# Patient Record
Sex: MALE | URBAN - METROPOLITAN AREA
[De-identification: names, ages, dates, MRNs, and addresses within clinical notes are randomized per-mention and may not be internally consistent; named-entity substitution may affect disease eponyms.]

---

## 2018-08-28 ENCOUNTER — INPATIENT (INPATIENT)
Facility: HOSPITAL | Age: 63
LOS: 2 days | Discharge: HOME | End: 2018-08-31
Attending: INTERNAL MEDICINE | Admitting: INTERNAL MEDICINE
Payer: COMMERCIAL

## 2018-08-28 VITALS
TEMPERATURE: 99 F | HEART RATE: 113 BPM | HEIGHT: 67 IN | DIASTOLIC BLOOD PRESSURE: 87 MMHG | RESPIRATION RATE: 20 BRPM | OXYGEN SATURATION: 97 % | WEIGHT: 279.99 LBS | SYSTOLIC BLOOD PRESSURE: 161 MMHG

## 2018-08-28 PROCEDURE — 99285 EMERGENCY DEPT VISIT HI MDM: CPT | Mod: 25

## 2018-08-28 NOTE — ED ADULT NURSE NOTE - OBJECTIVE STATEMENT
Per wife last seen normal Friday night , Saturday Morning noted intermediate slurring. Today pt is not slurring A & O x 4 able to follow simple commands. all 4 extremities strength is strong, pulse palpable in all 4 extremities . facial symmetrical, PERLLA noted.  Denies blurry vison , SOB and chest pain. +1 edema noted to BLE

## 2018-08-28 NOTE — ED ADULT NURSE NOTE - NSIMPLEMENTINTERV_GEN_ALL_ED
Implemented All Universal Safety Interventions:  [place] to call system. Call bell, personal items and telephone within reach. Instruct patient to call for assistance. Room bathroom lighting operational. Non-slip footwear when patient is off stretcher. Physically safe environment: no spills, clutter or unnecessary equipment. Stretcher in lowest position, wheels locked, appropriate side rails in place.

## 2018-08-29 DIAGNOSIS — E78.5 HYPERLIPIDEMIA, UNSPECIFIED: ICD-10-CM

## 2018-08-29 DIAGNOSIS — R47.1 DYSARTHRIA AND ANARTHRIA: ICD-10-CM

## 2018-08-29 DIAGNOSIS — I10 ESSENTIAL (PRIMARY) HYPERTENSION: ICD-10-CM

## 2018-08-29 LAB
ALBUMIN SERPL ELPH-MCNC: 3.5 G/DL — SIGNIFICANT CHANGE UP (ref 3.3–5)
ALP SERPL-CCNC: 40 U/L — SIGNIFICANT CHANGE UP (ref 40–120)
ALT FLD-CCNC: 24 U/L — SIGNIFICANT CHANGE UP (ref 12–78)
ANION GAP SERPL CALC-SCNC: 8 MMOL/L — SIGNIFICANT CHANGE UP (ref 5–17)
ANION GAP SERPL CALC-SCNC: 9 MMOL/L — SIGNIFICANT CHANGE UP (ref 5–17)
APTT BLD: 32.4 SEC — SIGNIFICANT CHANGE UP (ref 27.5–37.4)
AST SERPL-CCNC: 5 U/L — LOW (ref 15–37)
BASOPHILS # BLD AUTO: 0.02 K/UL — SIGNIFICANT CHANGE UP (ref 0–0.2)
BASOPHILS # BLD AUTO: 0.02 K/UL — SIGNIFICANT CHANGE UP (ref 0–0.2)
BASOPHILS NFR BLD AUTO: 0.3 % — SIGNIFICANT CHANGE UP (ref 0–2)
BASOPHILS NFR BLD AUTO: 0.4 % — SIGNIFICANT CHANGE UP (ref 0–2)
BILIRUB SERPL-MCNC: 0.5 MG/DL — SIGNIFICANT CHANGE UP (ref 0.2–1.2)
BUN SERPL-MCNC: 13 MG/DL — SIGNIFICANT CHANGE UP (ref 7–23)
BUN SERPL-MCNC: 18 MG/DL — SIGNIFICANT CHANGE UP (ref 7–23)
CALCIUM SERPL-MCNC: 8.7 MG/DL — SIGNIFICANT CHANGE UP (ref 8.5–10.1)
CALCIUM SERPL-MCNC: 8.9 MG/DL — SIGNIFICANT CHANGE UP (ref 8.5–10.1)
CHLORIDE SERPL-SCNC: 103 MMOL/L — SIGNIFICANT CHANGE UP (ref 96–108)
CHLORIDE SERPL-SCNC: 103 MMOL/L — SIGNIFICANT CHANGE UP (ref 96–108)
CHOLEST SERPL-MCNC: 139 MG/DL — SIGNIFICANT CHANGE UP (ref 10–199)
CK MB BLD-MCNC: 0.8 % — SIGNIFICANT CHANGE UP (ref 0–3.5)
CK MB CFR SERPL CALC: 1.5 NG/ML — SIGNIFICANT CHANGE UP (ref 0.5–3.6)
CK SERPL-CCNC: 184 U/L — SIGNIFICANT CHANGE UP (ref 26–308)
CO2 SERPL-SCNC: 27 MMOL/L — SIGNIFICANT CHANGE UP (ref 22–31)
CO2 SERPL-SCNC: 29 MMOL/L — SIGNIFICANT CHANGE UP (ref 22–31)
CREAT SERPL-MCNC: 0.96 MG/DL — SIGNIFICANT CHANGE UP (ref 0.5–1.3)
CREAT SERPL-MCNC: 1.13 MG/DL — SIGNIFICANT CHANGE UP (ref 0.5–1.3)
EOSINOPHIL # BLD AUTO: 0.09 K/UL — SIGNIFICANT CHANGE UP (ref 0–0.5)
EOSINOPHIL # BLD AUTO: 0.09 K/UL — SIGNIFICANT CHANGE UP (ref 0–0.5)
EOSINOPHIL NFR BLD AUTO: 1.4 % — SIGNIFICANT CHANGE UP (ref 0–6)
EOSINOPHIL NFR BLD AUTO: 1.6 % — SIGNIFICANT CHANGE UP (ref 0–6)
GLUCOSE SERPL-MCNC: 133 MG/DL — HIGH (ref 70–99)
GLUCOSE SERPL-MCNC: 143 MG/DL — HIGH (ref 70–99)
HBA1C BLD-MCNC: 7 % — HIGH (ref 4–5.6)
HCT VFR BLD CALC: 41.8 % — SIGNIFICANT CHANGE UP (ref 39–50)
HCT VFR BLD CALC: 42 % — SIGNIFICANT CHANGE UP (ref 39–50)
HDLC SERPL-MCNC: 30 MG/DL — LOW
HGB BLD-MCNC: 13.6 G/DL — SIGNIFICANT CHANGE UP (ref 13–17)
HGB BLD-MCNC: 13.7 G/DL — SIGNIFICANT CHANGE UP (ref 13–17)
IMM GRANULOCYTES NFR BLD AUTO: 0.3 % — SIGNIFICANT CHANGE UP (ref 0–1.5)
IMM GRANULOCYTES NFR BLD AUTO: 0.4 % — SIGNIFICANT CHANGE UP (ref 0–1.5)
INR BLD: 1.19 RATIO — HIGH (ref 0.88–1.16)
LIPID PNL WITH DIRECT LDL SERPL: 92 MG/DL — SIGNIFICANT CHANGE UP
LYMPHOCYTES # BLD AUTO: 1.21 K/UL — SIGNIFICANT CHANGE UP (ref 1–3.3)
LYMPHOCYTES # BLD AUTO: 1.45 K/UL — SIGNIFICANT CHANGE UP (ref 1–3.3)
LYMPHOCYTES # BLD AUTO: 21.5 % — SIGNIFICANT CHANGE UP (ref 13–44)
LYMPHOCYTES # BLD AUTO: 22.1 % — SIGNIFICANT CHANGE UP (ref 13–44)
MCHC RBC-ENTMCNC: 27.8 PG — SIGNIFICANT CHANGE UP (ref 27–34)
MCHC RBC-ENTMCNC: 27.9 PG — SIGNIFICANT CHANGE UP (ref 27–34)
MCHC RBC-ENTMCNC: 32.5 GM/DL — SIGNIFICANT CHANGE UP (ref 32–36)
MCHC RBC-ENTMCNC: 32.6 GM/DL — SIGNIFICANT CHANGE UP (ref 32–36)
MCV RBC AUTO: 85.4 FL — SIGNIFICANT CHANGE UP (ref 80–100)
MCV RBC AUTO: 85.8 FL — SIGNIFICANT CHANGE UP (ref 80–100)
MONOCYTES # BLD AUTO: 0.76 K/UL — SIGNIFICANT CHANGE UP (ref 0–0.9)
MONOCYTES # BLD AUTO: 0.78 K/UL — SIGNIFICANT CHANGE UP (ref 0–0.9)
MONOCYTES NFR BLD AUTO: 11.9 % — SIGNIFICANT CHANGE UP (ref 2–14)
MONOCYTES NFR BLD AUTO: 13.5 % — SIGNIFICANT CHANGE UP (ref 2–14)
NEUTROPHILS # BLD AUTO: 3.53 K/UL — SIGNIFICANT CHANGE UP (ref 1.8–7.4)
NEUTROPHILS # BLD AUTO: 4.21 K/UL — SIGNIFICANT CHANGE UP (ref 1.8–7.4)
NEUTROPHILS NFR BLD AUTO: 62.6 % — SIGNIFICANT CHANGE UP (ref 43–77)
NEUTROPHILS NFR BLD AUTO: 64 % — SIGNIFICANT CHANGE UP (ref 43–77)
NRBC # BLD: 0 /100 WBCS — SIGNIFICANT CHANGE UP (ref 0–0)
NRBC # BLD: 0 /100 WBCS — SIGNIFICANT CHANGE UP (ref 0–0)
PLATELET # BLD AUTO: 212 K/UL — SIGNIFICANT CHANGE UP (ref 150–400)
PLATELET # BLD AUTO: 235 K/UL — SIGNIFICANT CHANGE UP (ref 150–400)
POTASSIUM SERPL-MCNC: 3.5 MMOL/L — SIGNIFICANT CHANGE UP (ref 3.5–5.3)
POTASSIUM SERPL-MCNC: 3.5 MMOL/L — SIGNIFICANT CHANGE UP (ref 3.5–5.3)
POTASSIUM SERPL-SCNC: 3.5 MMOL/L — SIGNIFICANT CHANGE UP (ref 3.5–5.3)
POTASSIUM SERPL-SCNC: 3.5 MMOL/L — SIGNIFICANT CHANGE UP (ref 3.5–5.3)
PROT SERPL-MCNC: 7.7 GM/DL — SIGNIFICANT CHANGE UP (ref 6–8.3)
PROTHROM AB SERPL-ACNC: 13 SEC — HIGH (ref 9.8–12.7)
RBC # BLD: 4.87 M/UL — SIGNIFICANT CHANGE UP (ref 4.2–5.8)
RBC # BLD: 4.92 M/UL — SIGNIFICANT CHANGE UP (ref 4.2–5.8)
RBC # FLD: 14.2 % — SIGNIFICANT CHANGE UP (ref 10.3–14.5)
RBC # FLD: 14.4 % — SIGNIFICANT CHANGE UP (ref 10.3–14.5)
SODIUM SERPL-SCNC: 139 MMOL/L — SIGNIFICANT CHANGE UP (ref 135–145)
SODIUM SERPL-SCNC: 140 MMOL/L — SIGNIFICANT CHANGE UP (ref 135–145)
TOTAL CHOLESTEROL/HDL RATIO MEASUREMENT: 4.6 RATIO — SIGNIFICANT CHANGE UP (ref 3.4–9.6)
TRIGL SERPL-MCNC: 86 MG/DL — SIGNIFICANT CHANGE UP (ref 10–149)
TROPONIN I SERPL-MCNC: <.015 NG/ML — SIGNIFICANT CHANGE UP (ref 0.01–0.04)
WBC # BLD: 5.63 K/UL — SIGNIFICANT CHANGE UP (ref 3.8–10.5)
WBC # BLD: 6.57 K/UL — SIGNIFICANT CHANGE UP (ref 3.8–10.5)
WBC # FLD AUTO: 5.63 K/UL — SIGNIFICANT CHANGE UP (ref 3.8–10.5)
WBC # FLD AUTO: 6.57 K/UL — SIGNIFICANT CHANGE UP (ref 3.8–10.5)

## 2018-08-29 PROCEDURE — 93880 EXTRACRANIAL BILAT STUDY: CPT | Mod: 26

## 2018-08-29 PROCEDURE — 93010 ELECTROCARDIOGRAM REPORT: CPT

## 2018-08-29 PROCEDURE — 93306 TTE W/DOPPLER COMPLETE: CPT | Mod: 26

## 2018-08-29 PROCEDURE — 99223 1ST HOSP IP/OBS HIGH 75: CPT

## 2018-08-29 PROCEDURE — 12345: CPT | Mod: NC

## 2018-08-29 PROCEDURE — 70551 MRI BRAIN STEM W/O DYE: CPT | Mod: 26

## 2018-08-29 PROCEDURE — 70450 CT HEAD/BRAIN W/O DYE: CPT | Mod: 26

## 2018-08-29 PROCEDURE — 71045 X-RAY EXAM CHEST 1 VIEW: CPT | Mod: 26

## 2018-08-29 RX ORDER — HEPARIN SODIUM 5000 [USP'U]/ML
5000 INJECTION INTRAVENOUS; SUBCUTANEOUS EVERY 12 HOURS
Qty: 0 | Refills: 0 | Status: DISCONTINUED | OUTPATIENT
Start: 2018-08-29 | End: 2018-08-31

## 2018-08-29 RX ORDER — ATORVASTATIN CALCIUM 80 MG/1
40 TABLET, FILM COATED ORAL AT BEDTIME
Qty: 0 | Refills: 0 | Status: DISCONTINUED | OUTPATIENT
Start: 2018-08-29 | End: 2018-08-31

## 2018-08-29 RX ORDER — ASPIRIN/CALCIUM CARB/MAGNESIUM 324 MG
81 TABLET ORAL DAILY
Qty: 0 | Refills: 0 | Status: DISCONTINUED | OUTPATIENT
Start: 2018-08-30 | End: 2018-08-31

## 2018-08-29 RX ORDER — SODIUM CHLORIDE 9 MG/ML
3 INJECTION INTRAMUSCULAR; INTRAVENOUS; SUBCUTANEOUS ONCE
Qty: 0 | Refills: 0 | Status: COMPLETED | OUTPATIENT
Start: 2018-08-29 | End: 2018-08-29

## 2018-08-29 RX ORDER — AMLODIPINE BESYLATE 2.5 MG/1
2.5 TABLET ORAL DAILY
Qty: 0 | Refills: 0 | Status: DISCONTINUED | OUTPATIENT
Start: 2018-08-29 | End: 2018-08-31

## 2018-08-29 RX ORDER — ASPIRIN/CALCIUM CARB/MAGNESIUM 324 MG
325 TABLET ORAL ONCE
Qty: 0 | Refills: 0 | Status: COMPLETED | OUTPATIENT
Start: 2018-08-29 | End: 2018-08-29

## 2018-08-29 RX ORDER — LOSARTAN POTASSIUM 100 MG/1
50 TABLET, FILM COATED ORAL DAILY
Qty: 0 | Refills: 0 | Status: DISCONTINUED | OUTPATIENT
Start: 2018-08-29 | End: 2018-08-31

## 2018-08-29 RX ADMIN — Medication 325 MILLIGRAM(S): at 05:53

## 2018-08-29 RX ADMIN — HEPARIN SODIUM 5000 UNIT(S): 5000 INJECTION INTRAVENOUS; SUBCUTANEOUS at 17:50

## 2018-08-29 RX ADMIN — SODIUM CHLORIDE 3 MILLILITER(S): 9 INJECTION INTRAMUSCULAR; INTRAVENOUS; SUBCUTANEOUS at 01:00

## 2018-08-29 RX ADMIN — AMLODIPINE BESYLATE 2.5 MILLIGRAM(S): 2.5 TABLET ORAL at 05:57

## 2018-08-29 RX ADMIN — HEPARIN SODIUM 5000 UNIT(S): 5000 INJECTION INTRAVENOUS; SUBCUTANEOUS at 05:54

## 2018-08-29 RX ADMIN — ATORVASTATIN CALCIUM 40 MILLIGRAM(S): 80 TABLET, FILM COATED ORAL at 21:37

## 2018-08-29 NOTE — H&P ADULT - NSHPPHYSICALEXAM_GEN_ALL_CORE
T(C): 37.1 (28 Aug 2018 22:53), Max: 37.1 (28 Aug 2018 22:53)  T(F): 98.8 (28 Aug 2018 22:53), Max: 98.8 (28 Aug 2018 22:53)  HR: 95 (29 Aug 2018 01:23) (95 - 113)  BP: 161/87 (28 Aug 2018 22:53) (161/87 - 161/87)  BP(mean): --  RR: 20 (28 Aug 2018 22:53) (20 - 20)  SpO2: 97% (28 Aug 2018 22:53) (97% - 97%)    PHYSICAL EXAM:  GENERAL: NAD, well-groomed, well-developed  HEAD:  Atraumatic, Normocephalic  EYES: EOMI, PERRLA, conjunctiva and sclera clear  ENMT: No tonsillar erythema, exudates, or enlargement; Moist mucous membranes,  No lesions  NECK: Supple, No JVD, Normal thyroid  NERVOUS SYSTEM:  Alert & Oriented X3, slurred speech, R facial droop; Motor Strength 5/5 B/L upper and lower extremities; no sensory deficit, NIHSS= 2  CHEST/LUNG: Clear to percussion bilaterally; No rales, rhonchi, wheezing, or rubs  HEART: Regular rate and rhythm; No murmurs, rubs, or gallops  ABDOMEN: Soft, Nontender, Nondistended; Bowel sounds present  EXTREMITIES:  + Peripheral Pulses, No clubbing, cyanosis, or edema  LYMPH: No lymphadenopathy noted  SKIN: No rashes or lesions

## 2018-08-29 NOTE — H&P ADULT - HISTORY OF PRESENT ILLNESS
63 y/o male with pmh HTN, HL, presents with dysarthria x 3 days. Pt came from [facility] 3 days ago, denies any other symptoms. Former smoker, denies ETOH, diabetes, CP, cough, SOB, palpitations, abd pain, N/V, fever, chills, focal weakness/numbness.

## 2018-08-29 NOTE — ED ADULT NURSE REASSESSMENT NOTE - NS ED NURSE REASSESS COMMENT FT1
as per Dr. Eng, pt downgrade to med-[facility] [place] Heriberto myles nurse manager aware. report given to KONG egan
pt awake, alert, orient x 3, no c/o at this time. no acute distress noted. pending mri, sono, admit
pt taken to mri at this time. awaiting return to ED, pending admit to unit
Pt swallowed AM meds without difficulty. Pt has no change in speech from initial assessment.
50 feet

## 2018-08-29 NOTE — H&P ADULT - NSHPLABSRESULTS_GEN_ALL_CORE
LABS:                        13.6   6.57  )-----------( 212      ( 29 Aug 2018 01:03 )             41.8     08-29    139  |  103  |  18  ----------------------------<  143<H>  3.5   |  27  |  1.13    Ca    8.7      29 Aug 2018 01:03    TPro  7.7  /  Alb  3.5  /  TBili  0.5  /  DBili  x   /  AST  5<L>  /  ALT  24  /  AlkPhos  40  08-29    PT/INR - ( 29 Aug 2018 01:03 )   PT: 13.0 sec;   INR: 1.19 ratio         PTT - ( 29 Aug 2018 01:03 )  PTT:32.4 sec    CAPILLARY BLOOD GLUCOSE  Troponin I, Serum: <.015:  ng/mL (08.29.18 @ 01:03)          RADIOLOGY & ADDITIONAL TESTS:  < from: CT Head No Cont (08.29.18 @ 00:53) >    No acute intracranial bleeding, mass effect, or shift. Mild chronic   microvascular ischemic changes.     < end of copied text >      Imaging Personally Reviewed:  [x ] YES  [ ] NO  EKG: sinus@ 103 PVC no ischemic changes

## 2018-08-29 NOTE — H&P ADULT - ASSESSMENT
61 y/o male with pmh HTN, HL, presents with dysarthria x 3 days w facial droop. NIHSS  =2.  Pt started on ASA, statin, continue BP meds.  IMPROVE VTE Individual Risk Assessment          RISK                                                          Points    [  ] Previous VTE                                                3    [  ] Thrombophilia                                             2    [  ] Lower limb paralysis                                    2        (unable to hold up >15 seconds)      [  ] Current Cancer                                             2         (within 6 months)    [  ] Immobilization > 24 hrs                              1    [  ] ICU/CCU stay > 24 hours                            1    [x  ] Age > 60                                                    1    IMPROVE VTE Score ___1______

## 2018-08-29 NOTE — H&P ADULT - NSHPREVIEWOFSYSTEMS_GEN_ALL_CORE
ROS: No fever/chills. No photophobia/eye pain/changes in vision, No ear pain/sore throat/dysphagia, No chest pain/palpitations. No SOB/cough/stridor. No abdominal pain, N/V/D, no black/bloody bm. No dysuria/frequency/discharge, No headache. No Dizziness.  No rash.  No numbness/tingling/weakness.

## 2018-08-29 NOTE — CHART NOTE - NSCHARTNOTEFT_GEN_A_CORE
Pt. admitted by nocturnist overnight. Signed out to day hospitalist team this am.  HPI:  61 y/o male with pmh HTN, HL, presents with dysarthria x 3 days. Pt came from [facility] 3 days ago, denies any other symptoms. Former smoker, denies ETOH, diabetes, CP, cough, SOB, palpitations, abd pain, N/V, fever, chills, focal weakness/numbness.  Pt. admitted for r/o stroke  < from: CT Head No Cont (08.29.18 @ 00:53) >    EXAM:  CT BRAIN                            PROCEDURE DATE:  08/29/2018     < end of copied text >    < from: CT Head No Cont (08.29.18 @ 00:53) >      IMPRESSION:     No acute intracranial bleeding, mass effect, or shift. Mild chronic   microvascular ischemic changes.     < from: MR Head No Cont (08.29.18 @ 08:44) >      EXAM:  MR BRAIN                            PROCEDURE DATE:  08/29/2018      < end of copied text >    < from: MR Head No Cont (08.29.18 @ 08:44) >    for age.    IMPRESSION:  Limited, incomplete study. No diffusion evidence of acute infarct. Repeat   examination may be performed as clinically indicated.    < from: US Duplex Carotid Arteries Complete, Bilateral (08.29.18 @ 09:19) >      EXAM:  US DPLX CAROTIDS COMPL BI                            PROCEDURE DATE:  08/29/2018  < from: US Duplex Carotid Arteries Complete, Bilateral (08.29.18 @ 09:19) >      Impression: There isno significant hemodynamic stenosis of either   carotid artery.    Pt. and family feel he still has some dysarthria, "tongue tied" that comes and goes, no other symptoms. Pt. 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  fu TTE results, fu neuro recs.

## 2018-08-29 NOTE — ED PROVIDER NOTE - PHYSICAL EXAMINATION
Gen: Alert, [place]. NAD    Head: NC, AT, PERRL, EOMI, normal lids/conjunctiva   ENT: Bilateral TM WNL, normal hearing, patent oropharynx without erythema/exudate, uvula midline  Neck: supple, no tenderness/meningismus/JVD   Pulm: Bilateral clear BS, normal resp effort, no wheeze/stridor/retractions  CV: RRR, no M/R/G, +dist pulses   Abd: soft, NT/ND, +BS, no guarding/rebound tenderness  Mskel: no edema/erythema/cyanosis   Skin: no rash   Neuro: AAOx3, very very scant dysarthria and slight droop of rt lip

## 2018-08-29 NOTE — ED PROVIDER NOTE - OBJECTIVE STATEMENT
61yo male with pmh HTN, HL, presents with dysarthria x 3 days. Pt came from [facility] 3 days ago. denies any other symptoms.    ROS: No fever/chills. No photophobia/eye pain/changes in vision, No ear pain/sore throat/dysphagia, No chest pain/palpitations. No SOB/cough/stridor. No abdominal pain, N/V/D, no black/bloody bm. No dysuria/frequency/discharge, No headache. No Dizziness.  No rash.  No numbness/tingling/weakness.

## 2018-08-30 DIAGNOSIS — I63.9 CEREBRAL INFARCTION, UNSPECIFIED: ICD-10-CM

## 2018-08-30 PROCEDURE — 99233 SBSQ HOSP IP/OBS HIGH 50: CPT

## 2018-08-30 RX ADMIN — LOSARTAN POTASSIUM 50 MILLIGRAM(S): 100 TABLET, FILM COATED ORAL at 05:39

## 2018-08-30 RX ADMIN — AMLODIPINE BESYLATE 2.5 MILLIGRAM(S): 2.5 TABLET ORAL at 05:39

## 2018-08-30 RX ADMIN — HEPARIN SODIUM 5000 UNIT(S): 5000 INJECTION INTRAVENOUS; SUBCUTANEOUS at 05:39

## 2018-08-30 RX ADMIN — Medication 81 MILLIGRAM(S): at 12:04

## 2018-08-30 RX ADMIN — HEPARIN SODIUM 5000 UNIT(S): 5000 INJECTION INTRAVENOUS; SUBCUTANEOUS at 19:35

## 2018-08-30 RX ADMIN — ATORVASTATIN CALCIUM 40 MILLIGRAM(S): 80 TABLET, FILM COATED ORAL at 21:53

## 2018-08-30 NOTE — PHYSICAL THERAPY INITIAL EVALUATION ADULT - ADDITIONAL COMMENTS
Patient lives in a private house with 4 steps to enter and 10 steps once inside, +railings. Patient independent without device prior.

## 2018-08-30 NOTE — OCCUPATIONAL THERAPY INITIAL EVALUATION ADULT - GENERAL OBSERVATIONS, REHAB EVAL
Pt was encountered supine in bed; NAD, slightly dysarthric speech AXOX4, cooperative, followed commands.

## 2018-08-30 NOTE — SWALLOW BEDSIDE ASSESSMENT ADULT - SWALLOW EVAL: PATIENT/FAMILY GOALS STATEMENT
pt denied difficulty swallowing but may be biting his tongue once in while. pt stated his speech was affected- "my wife's sister said I didn't sound right"

## 2018-08-30 NOTE — SWALLOW BEDSIDE ASSESSMENT ADULT - SLP GENERAL OBSERVATIONS
pt seen bedside alert and oriented x4. pt responded to autobiographical/want questions, verbalized needs and he was able to follow one step directions. noted reduced articulatory precision- causing decreased speech intelligibility depending on complexity/length of utterance.

## 2018-08-30 NOTE — CONSULT NOTE ADULT - SUBJECTIVE AND OBJECTIVE BOX
Subjective Complaints:  Historian:       Consult requested by ER doctor:                  Attending:     HPI:  63 y/o male with pmh HTN, HL, presents with dysarthria x 3 days. Pt came from [facility] 3 days ago, denies any other symptoms. Former smoker, denies ETOH, diabetes, CP, cough, SOB, palpitations, abd pain, N/V, fever, chills, focal weakness/numbness. (29 Aug 2018 05:19)ACCORDING TO THE PATIENT ,HIS SPEECH HAS IMPROVED BUT IT IS NOT YET BACK TO NORMAL . HE HAS PAIN AT THE TIP OF HIS TONGUE     SANDY SAAVEDRA    PAST MEDICAL & SURGICAL HISTORY:  Hyperlipidemia, unspecified hyperlipidemia type  Essential hypertension  No significant past surgical history  62yMale    MEDICATIONS  (STANDING):  amLODIPine   Tablet 2.5 milliGRAM(s) Oral daily  aspirin enteric coated 81 milliGRAM(s) Oral daily  atorvastatin 40 milliGRAM(s) Oral at bedtime  heparin  Injectable 5000 Unit(s) SubCutaneous every 12 hours  losartan 50 milliGRAM(s) Oral daily    MEDICATIONS  (PRN):      Allergies    No Known Allergies    Intolerances      FAMILY HISTORY:  No pertinent family history in first degree relatives      REVIEW OF SYSTEMS:  General:  No wt loss, fevers, chills, night sweats  Eyes:  Good vision, no reported pain  ENT:  No sore throat, pain, runny nose, dysphagia  CV:  No pain, palpitatioins, hypo/hypertension  Resp:  No dyspnea, cough, tachypnea, wheezing  GI:  No pain, nausea, vomiting, diarrhea, constipatiion  :  No pain, bleeding, incontinence, nocturia  Muscle:  No pain, weakness  Breast:  No pain, abscess, mass, discharge  Neuro:  No weakness, tingling, memory problems  Psych:  No fatigue, insomnia, mood problems, depression  Endocrine:  No polyuria, polydypsia, cold/heat intolerance  Heme:  No petechiae, ecchymosis, easy bruisability  Skin:  No rash, tattoos, scars, edema      Vital Signs Last 24 Hrs  T(C): 36.7 (30 Aug 2018 05:57), Max: 37.6 (29 Aug 2018 17:32)  T(F): 98.1 (30 Aug 2018 05:57), Max: 99.6 (29 Aug 2018 17:32)  HR: 83 (30 Aug 2018 05:57) (83 - 98)  BP: 126/76 (30 Aug 2018 05:57) (118/62 - 152/75)  BP(mean): --  RR: 19 (30 Aug 2018 05:57) (17 - 19)  SpO2: 97% (30 Aug 2018 05:57) (96% - 99%)    GENERAL PHYSICAL EXAM:  General:  Appears stated age, well-groomed, well-nourished, no distress  HEENT:  NC/AT, patent nares w/ pink mucosa, OP clear w/o lesions, PERRL, EOMI, conjunctivae clear, no thyromegaly, nodules, adenopathy, no JVD  Chest:  Full & symmetric excursion, no increased effort, breath sounds clear  Cardiovascular:  Regular rhythm, S1, S2, no murmur/rub/S3/S4, no carotid/femoral/abdominal bruit, radial/pedal pulses 2+, no edema  Abdomen:  Soft, non-tender, non-distended, normoactive bowel sounds, no HSM  Extremities:  Gait & station:   Digits:   Nails:   Joints, Bones, Muscles:   ROM:   Stability:  Skin:  No rash/erythema/ecchymoses/petechiae/wounds/abscess/warm/dry  Musculoskeletal:  Full ROM in all joints w/o swelling/tenderness/effusion    NEUROLOGICAL EXAM:  HENT:  Normocephalic head; atraumatic head.  Neck supple.  ENT: normal looking.  Mental State:    Alert.  Fully oriented to person, place and date.  Coherent.  Speech IS SLURRED .  Cooperative.  Responds appropriately.    Cranial Nerves:  II-XII:   Pupils round and reactive to light and accommodation.  Extraocular movements full.  Visual fields full (no homonymous hemianopsia).  Visual acuity wnl.  Facial symmetry intact.  Tongue midline.  Motor Functions:  Moves all extremities.  No pronator drift of UE.  MOTOR STRENGHT IS 5/5 THROUGHOUT    Sensory Functions:   Intact to touch and pinprick to face and extremities.    Reflexes:  Deep tendon reflexes normoactive to biceps, knees and ankles.  Babinski absent (present).  Cerebellar Testing:    Finger to nose intact.  Nystagmus absent.  Gait: unremarkable      LABS:                        13.7   5.63  )-----------( 235      ( 29 Aug 2018 07:41 )             42.0     08-29    140  |  103  |  13  ----------------------------<  133<H>  3.5   |  29  |  0.96    Ca    8.9      29 Aug 2018 07:41    TPro  7.7  /  Alb  3.5  /  TBili  0.5  /  DBili  x   /  AST  5<L>  /  ALT  24  /  AlkPhos  40  08-29    PT/INR - ( 29 Aug 2018 01:03 )   PT: 13.0 sec;   INR: 1.19 ratio         PTT - ( 29 Aug 2018 01:03 )  PTT:32.4 sec        RADIOLOGY & ADDITIONAL STUDIES:        Assessment & Opinion: 62 y o man with h/o htn,hl found to have a dysarthric speech and an unremarkable brain mri     DX CLINICAL CVA                     Recommendations:    Carotid doppler.  Echocardiogram.    DVT prophylaxis as ordered.  Medications:  ASA, STATIN DRUG

## 2018-08-30 NOTE — PHYSICAL THERAPY INITIAL EVALUATION ADULT - PERTINENT HX OF CURRENT PROBLEM, REHAB EVAL
Patient admitted with dysarthria x 3 days after traveling to [facility]. Imaging unremarkable, Neuro diagnosis with clinical CVA.

## 2018-08-30 NOTE — PROGRESS NOTE ADULT - PROBLEM SELECTOR PLAN 1
cont with aspirin  appreciate neuro eval  appreciate speech, regular diet ok  fu OT/PT eval  cont with statin

## 2018-08-30 NOTE — SWALLOW BEDSIDE ASSESSMENT ADULT - COMMENTS
MRI head 8/30/2018 IMPRESSION:Limited, incomplete study. No diffusion evidence of acute infarct. Repeat examination may be performed as clinically indicated.  CXR 8/29/2018Impression: No acute pulmonary disease.Cardiomegaly MRI head 8/30/2018 IMPRESSION:Limited, incomplete study. No diffusion evidence of acute infarct. Repeat examination may be performed as clinically indicated.  CXR 8/29/2018Impression: No acute pulmonary disease. Cardiomegaly

## 2018-08-30 NOTE — OCCUPATIONAL THERAPY INITIAL EVALUATION ADULT - PERTINENT HX OF CURRENT PROBLEM, REHAB EVAL
Patient admitted to [facility] due to Dysarthria. Patient had MRI of brain done on 5/29/18 which revealed Limited, incomplete study. No diffusion evidence of acute infarct.

## 2018-08-30 NOTE — OCCUPATIONAL THERAPY INITIAL EVALUATION ADULT - ADDITIONAL COMMENTS
Patient lives in a private house with 4 steps to enter with railings on both sides. Once inside, the patient has 10 steps with a L handrail to reach main floor where bedroom and bathroom is. The patients bathroom has a tub/shower combination with a regular toilet. The patient reports having no DME inside of the bathroom. The patient ambulates with no device and does not own any device for ambulation. The patient is R handed, wears glasses for reading and drives.

## 2018-08-30 NOTE — SWALLOW BEDSIDE ASSESSMENT ADULT - SWALLOW EVAL: DIAGNOSIS
pt presented with oropharyngeal phases of swallow grossly WNL except inconsistent trace anterior loss, no overt signs of aspiration

## 2018-08-30 NOTE — PROGRESS NOTE ADULT - ASSESSMENT
63 y/o male with pmh HTN, HL, presents with dysarthria x 3 days. Pt came from [facility] 3 days ago, denies any other symptoms. Former smoker, denies ETOH, diabetes, CP, cough, SOB, palpitations, abd pain, N/V, fever, chills, focal weakness/numbness. Pt. here with clinical stroke

## 2018-08-30 NOTE — OCCUPATIONAL THERAPY INITIAL EVALUATION ADULT - ORIENTATION, REHAB EVAL
Patient is functioning at a level 5 Higher level cognitive function on the [facility] cognitive Continuum/oriented to person, place, time and situation

## 2018-08-30 NOTE — PROGRESS NOTE ADULT - SUBJECTIVE AND OBJECTIVE BOX
Patient is a 62y old  Male who presents with a chief complaint of Slurred speech. (29 Aug 2018 05:19)      INTERVAL HPI/OVERNIGHT EVENTS:  pt. feels better, not sure if the problem has totally resolved, seems to have to concentrate to get words out without slurring. denies any other focal weakness or numbness.    MEDICATIONS  (STANDING):  amLODIPine   Tablet 2.5 milliGRAM(s) Oral daily  aspirin enteric coated 81 milliGRAM(s) Oral daily  atorvastatin 40 milliGRAM(s) Oral at bedtime  heparin  Injectable 5000 Unit(s) SubCutaneous every 12 hours  losartan 50 milliGRAM(s) Oral daily    MEDICATIONS  (PRN):      Allergies    No Known Allergies    Intolerances        REVIEW OF SYSTEMS:  CONSTITUTIONAL: No fever, weight loss, + fatigue  EYES: No eye pain, visual disturbances, or discharge  ENMT:  No difficulty hearing, tinnitus, vertigo; No sinus or throat pain  RESPIRATORY: No cough, wheezing, chills or hemoptysis; No shortness of breath  CARDIOVASCULAR: No chest pain, palpitations, dizziness, or leg swelling  GASTROINTESTINAL: No abdominal or epigastric pain. No nausea, vomiting, or hematemesis; No diarrhea or constipation. No melena or hematochezia.  GENITOURINARY: No dysuria, frequency, hematuria, or incontinence  NEUROLOGICAL: difficulty with speech  SKIN: No itching, burning, rashes, or lesions   LYMPH NODES: No enlarged glands  ENDOCRINE: No heat or cold intolerance; No hair loss  MUSCULOSKELETAL: No joint pain or swelling; No muscle, back, or extremity pain      Vital Signs Last 24 Hrs  T(C): 36.6 (30 Aug 2018 12:30), Max: 37.6 (29 Aug 2018 17:32)  T(F): 97.8 (30 Aug 2018 12:30), Max: 99.6 (29 Aug 2018 17:32)  HR: 83 (30 Aug 2018 16:42) (83 - 96)  BP: 123/74 (30 Aug 2018 16:42) (123/74 - 135/58)  BP(mean): --  RR: 18 (30 Aug 2018 16:42) (18 - 19)  SpO2: 98% (30 Aug 2018 16:42) (96% - 98%)    PHYSICAL EXAM:  GENERAL: NAD, well-groomed, well-developed  HEAD:  Atraumatic, Normocephalic  EYES: EOMI, PERRLA, conjunctiva and sclera clear  ENMT: No tonsillar erythema, exudates, or enlargement; Moist mucous membranes,  NECK: Supple, No JVD, Normal thyroid  NERVOUS SYSTEM:  Alert & Oriented X3, Good concentration; Motor Strength 5/5 B/L upper and lower extremities; DTRs 2+ intact and symmetric  CHEST/LUNG: Clear to percussion bilaterally; No rales, rhonchi, wheezing, or rubs  HEART: Regular rate and rhythm; No murmurs, rubs, or gallops  ABDOMEN: Soft, Nontender, Nondistended; Bowel sounds present, obese  EXTREMITIES:  2+ Peripheral Pulses, No clubbing, cyanosis, or edema  SKIN: No rashes or lesions    LABS:                        13.7   5.63  )-----------( 235      ( 29 Aug 2018 07:41 )             42.0     08-29    140  |  103  |  13  ----------------------------<  133<H>  3.5   |  29  |  0.96    Ca    8.9      29 Aug 2018 07:41    TPro  7.7  /  Alb  3.5  /  TBili  0.5  /  DBili  x   /  AST  5<L>  /  ALT  24  /  AlkPhos  40  08-29    PT/INR - ( 29 Aug 2018 01:03 )   PT: 13.0 sec;   INR: 1.19 ratio         PTT - ( 29 Aug 2018 01:03 )  PTT:32.4 sec    CAPILLARY BLOOD GLUCOSE          RADIOLOGY & ADDITIONAL TESTS:  < from: TTE Echo Doppler w/o Cont (08.29.18 @ 10:17) >     EXAM:  TTE WO CON COMPLETE W DOPPL         PROCEDURE DATE:  08/29/2018      < end of copied text >  < from: TTE Echo Doppler w/o Cont (08.29.18 @ 10:17) >    Summary:   1. Left ventricular ejection fraction, by visual estimation, is 55 to   60%.   2. There is no left ventricular hypertrophy.   3. Mild tricuspid regurgitation.   4. Pulmonic valve regurgitation.   5. No evidence of any thrombus.    < end of copied text >      Imaging Personally Reviewed:  [ x] YES  [ ] NO    Consultant(s) Notes Reviewed:  [x ] YES  [ ] NO    Care Discussed with Consultants/Other Providers [x ] YES  [ ] NO

## 2018-08-31 ENCOUNTER — TRANSCRIPTION ENCOUNTER (OUTPATIENT)
Age: 63
End: 2018-08-31

## 2018-08-31 VITALS
OXYGEN SATURATION: 98 % | DIASTOLIC BLOOD PRESSURE: 90 MMHG | TEMPERATURE: 98 F | HEART RATE: 86 BPM | RESPIRATION RATE: 18 BRPM | SYSTOLIC BLOOD PRESSURE: 151 MMHG

## 2018-08-31 PROCEDURE — 99239 HOSP IP/OBS DSCHRG MGMT >30: CPT

## 2018-08-31 RX ORDER — ROSUVASTATIN CALCIUM 5 MG/1
1 TABLET ORAL
Qty: 0 | Refills: 0 | COMMUNITY

## 2018-08-31 RX ORDER — TAMSULOSIN HYDROCHLORIDE 0.4 MG/1
1 CAPSULE ORAL
Qty: 0 | Refills: 0 | COMMUNITY

## 2018-08-31 RX ORDER — LABETALOL HCL 100 MG
1 TABLET ORAL
Qty: 0 | Refills: 0 | COMMUNITY

## 2018-08-31 RX ORDER — AMLODIPINE BESYLATE 2.5 MG/1
1 TABLET ORAL
Qty: 30 | Refills: 0 | OUTPATIENT
Start: 2018-08-31 | End: 2018-09-29

## 2018-08-31 RX ORDER — SIMVASTATIN 20 MG/1
1 TABLET, FILM COATED ORAL
Qty: 0 | Refills: 0 | COMMUNITY

## 2018-08-31 RX ORDER — ASPIRIN/CALCIUM CARB/MAGNESIUM 324 MG
1 TABLET ORAL
Qty: 30 | Refills: 0 | OUTPATIENT
Start: 2018-08-31 | End: 2018-09-29

## 2018-08-31 RX ORDER — LOSARTAN POTASSIUM 100 MG/1
1 TABLET, FILM COATED ORAL
Qty: 0 | Refills: 0 | COMMUNITY

## 2018-08-31 RX ORDER — LISINOPRIL 2.5 MG/1
1 TABLET ORAL
Qty: 0 | Refills: 0 | COMMUNITY

## 2018-08-31 RX ORDER — METFORMIN HYDROCHLORIDE 850 MG/1
1 TABLET ORAL
Qty: 60 | Refills: 0 | OUTPATIENT
Start: 2018-08-31 | End: 2018-09-29

## 2018-08-31 RX ORDER — AMLODIPINE BESYLATE 2.5 MG/1
1 TABLET ORAL
Qty: 0 | Refills: 0 | COMMUNITY

## 2018-08-31 RX ORDER — METFORMIN HYDROCHLORIDE 850 MG/1
1 TABLET ORAL
Qty: 0 | Refills: 0 | COMMUNITY

## 2018-08-31 RX ORDER — LEVETIRACETAM 250 MG/1
1 TABLET, FILM COATED ORAL
Qty: 0 | Refills: 0 | COMMUNITY

## 2018-08-31 RX ORDER — ROPINIROLE 8 MG/1
1 TABLET, FILM COATED, EXTENDED RELEASE ORAL
Qty: 0 | Refills: 0 | COMMUNITY

## 2018-08-31 RX ADMIN — AMLODIPINE BESYLATE 2.5 MILLIGRAM(S): 2.5 TABLET ORAL at 05:58

## 2018-08-31 RX ADMIN — Medication 81 MILLIGRAM(S): at 12:10

## 2018-08-31 RX ADMIN — HEPARIN SODIUM 5000 UNIT(S): 5000 INJECTION INTRAVENOUS; SUBCUTANEOUS at 05:58

## 2018-08-31 NOTE — DISCHARGE NOTE ADULT - HOSPITAL COURSE
63 y/o male with pmh HTN, HL, presents with dysarthria x 3 days. Pt came from [facility] 3 days ago, denies any other symptoms. Former smoker, denies ETOH, diabetes, CP, cough, SOB, palpitations, abd pain, N/V, fever, chills, focal weakness/numbness.   Symptoms resolved somewhat, but still present. "feels tongue-tied", Imaging negative for acute findings but evaluated by Neuro and Clinical Stroke diagnosed. Pt. started on aspirin, statin, cont with blood pressure control. New diabetes diagnosis Hgb A1C of 7.0. Metformin started.  Evaluated by speech, PT, OT, no rehab indicated.  Pt. discharged to home to follow-up with his PCP and neuro

## 2018-08-31 NOTE — DISCHARGE NOTE ADULT - CARE PLAN
Principal Discharge DX:	Cerebrovascular accident (CVA), unspecified mechanism  Goal:	decrease stroke risk  Assessment and plan of treatment:	aspirin, statin  good control of blood pressure and diabetes  Secondary Diagnosis:	Essential hypertension  Assessment and plan of treatment:	continue with amlodipine at lower dose 5 mg dailu  Secondary Diagnosis:	Hyperlipidemia, unspecified hyperlipidemia type  Assessment and plan of treatment:	continue with statin  Secondary Diagnosis:	Type 2 diabetes mellitus with other neurologic complication, without long-term current use of insulin  Assessment and plan of treatment:	new diagnosis, consistent carb diet, metformin  your hemoglobin A1C is 7.0  review with your doctor  Secondary Diagnosis:	Class 3 severe obesity due to excess calories with serious comorbidity and body mass index (BMI) of 40.0 to 44.9 in adult  Assessment and plan of treatment:	discuss diet and exercise with your primary care provider

## 2018-08-31 NOTE — DISCHARGE NOTE ADULT - NS AS DC STROKE ED MATERIALS
Risk Factors for Stroke/Stroke Education Booklet/Prescribed Medications/Need for Followup After Discharge/Call 911 for Stroke/Stroke Warning Signs and Symptoms

## 2018-08-31 NOTE — DISCHARGE NOTE ADULT - SECONDARY DIAGNOSIS.
Type 2 diabetes mellitus with other neurologic complication, without long-term current use of insulin Class 3 severe obesity due to excess calories with serious comorbidity and body mass index (BMI) of 40.0 to 44.9 in adult Essential hypertension Hyperlipidemia, unspecified hyperlipidemia type

## 2018-08-31 NOTE — DISCHARGE NOTE ADULT - MEDICATION SUMMARY - MEDICATIONS TO TAKE
I will START or STAY ON the medications listed below when I get home from the hospital:    aspirin 81 mg oral delayed release tablet  -- 1 tab(s) by mouth once a day   -- Indication: For Cerebrovascular accident (CVA), unspecified mechanism    tamsulosin 0.4 mg oral capsule  -- 1 cap(s) by mouth once a day  -- Indication: For bph    simvastatin 10 mg oral tablet  -- 1 tab(s) by mouth once a day (at bedtime)  -- Indication: For Cerebrovascular accident (CVA), unspecified mechanism    amLODIPine 5 mg oral tablet  -- 1 tab(s) by mouth once a day   -- It is very important that you take or use this exactly as directed.  Do not skip doses or discontinue unless directed by your doctor.  Some non-prescription drugs may aggravate your condition.  Read all labels carefully.  If a warning appears, check with your doctor before taking.    -- Indication: For Essential hypertension

## 2018-08-31 NOTE — DISCHARGE NOTE ADULT - PLAN OF CARE
continue with statin new diagnosis, consistent carb diet, metformin  your hemoglobin A1C is 7.0  review with your doctor discuss diet and exercise with your primary care provider decrease stroke risk aspirin, statin  good control of blood pressure and diabetes continue with amlodipine at lower dose 5 mg dailu

## 2018-08-31 NOTE — DISCHARGE NOTE ADULT - CARE PROVIDER_API CALL
Dr. Ketan Guzman  [facility], [place]  Phone: (   )    -  Fax: (   )    -    Froylan Greene), Neurology  [address] [place] [address]  Phone: [phone]  Fax: [phone]

## 2018-08-31 NOTE — DISCHARGE NOTE ADULT - PROVIDER TOKENS
FREE:[LAST:[Megan],FIRST:[Dr. Aceves],PHONE:[(   )    -],FAX:[(   )    -],ADDRESS:[[facility], [place]]],TOKEN:'[id number]'

## 2018-08-31 NOTE — DISCHARGE NOTE ADULT - PATIENT PORTAL LINK FT
You can access the [facility], offered by [facility], by registering with the following website: [email or web address]

## 2018-09-05 DIAGNOSIS — I63.9 CEREBRAL INFARCTION, UNSPECIFIED: ICD-10-CM

## 2018-09-05 DIAGNOSIS — Z87.891 PERSONAL HISTORY OF NICOTINE DEPENDENCE: ICD-10-CM

## 2018-09-05 DIAGNOSIS — R47.81 SLURRED SPEECH: ICD-10-CM

## 2018-09-05 DIAGNOSIS — E11.49 TYPE 2 DIABETES MELLITUS WITH OTHER DIABETIC NEUROLOGICAL COMPLICATION: ICD-10-CM

## 2018-09-05 DIAGNOSIS — E66.09 OTHER OBESITY DUE TO EXCESS CALORIES: ICD-10-CM

## 2018-09-05 DIAGNOSIS — R47.1 DYSARTHRIA AND ANARTHRIA: ICD-10-CM

## 2018-09-05 DIAGNOSIS — I10 ESSENTIAL (PRIMARY) HYPERTENSION: ICD-10-CM

## 2018-09-05 DIAGNOSIS — E78.5 HYPERLIPIDEMIA, UNSPECIFIED: ICD-10-CM

## 2018-09-05 DIAGNOSIS — N40.0 BENIGN PROSTATIC HYPERPLASIA WITHOUT LOWER URINARY TRACT SYMPTOMS: ICD-10-CM

## 2019-11-25 NOTE — DISCHARGE NOTE ADULT - NS AS DC STROKE DX YN
Called and spoke with Laz Will LPN with [facility]. She explained urine  and was not able to perform test. She will obtain another specimen for testing. Will forward results. yes

## 2020-07-13 PROBLEM — Z00.00 ENCOUNTER FOR PREVENTIVE HEALTH EXAMINATION: Status: ACTIVE | Noted: 2020-07-13

## 2020-07-14 PROBLEM — E78.5 HYPERLIPIDEMIA, UNSPECIFIED: Chronic | Status: ACTIVE | Noted: 2018-08-29

## 2020-07-14 PROBLEM — I10 ESSENTIAL (PRIMARY) HYPERTENSION: Chronic | Status: ACTIVE | Noted: 2018-08-29

## 2020-07-23 NOTE — OCCUPATIONAL THERAPY INITIAL EVALUATION ADULT - MD/RN NOTIFIED
0745 Bedside and Verbal shift change report given to HEAVEN RN  (oncoming nurse) by Fili Murphy RN  (offgoing nurse).  Report included the following information SBAR, Kardex, Intake/Output and MAR.       0900 due meds given, tolerated well, continue to monitor      1300 getting ready for transfr to snf  Verbalized understanding of discharge and transfer instructions,     1343 Report given to Bridger at   [address] yes

## 2020-08-28 ENCOUNTER — APPOINTMENT (OUTPATIENT)
Dept: NEUROLOGY | Facility: CLINIC | Age: 65
End: 2020-08-28

## 2020-10-29 ENCOUNTER — TRANSCRIPTION ENCOUNTER (OUTPATIENT)
Age: 65
End: 2020-10-29

## 2020-10-29 ENCOUNTER — APPOINTMENT (OUTPATIENT)
Dept: NEUROLOGY | Facility: CLINIC | Age: 65
End: 2020-10-29
Payer: COMMERCIAL

## 2020-10-29 VITALS — TEMPERATURE: 97.6 F

## 2020-10-29 VITALS
HEIGHT: 67 IN | DIASTOLIC BLOOD PRESSURE: 76 MMHG | HEART RATE: 102 BPM | SYSTOLIC BLOOD PRESSURE: 125 MMHG | WEIGHT: 245 LBS | BODY MASS INDEX: 38.45 KG/M2

## 2020-10-29 DIAGNOSIS — Z86.79 PERSONAL HISTORY OF OTHER DISEASES OF THE CIRCULATORY SYSTEM: ICD-10-CM

## 2020-10-29 DIAGNOSIS — Z85.46 PERSONAL HISTORY OF MALIGNANT NEOPLASM OF PROSTATE: ICD-10-CM

## 2020-10-29 DIAGNOSIS — R29.898 OTHER SYMPTOMS AND SIGNS INVOLVING THE MUSCULOSKELETAL SYSTEM: ICD-10-CM

## 2020-10-29 DIAGNOSIS — E11.9 TYPE 2 DIABETES MELLITUS W/OUT COMPLICATIONS: ICD-10-CM

## 2020-10-29 DIAGNOSIS — Z82.49 FAMILY HISTORY OF ISCHEMIC HEART DISEASE AND OTHER DISEASES OF THE CIRCULATORY SYSTEM: ICD-10-CM

## 2020-10-29 PROCEDURE — 99244 OFF/OP CNSLTJ NEW/EST MOD 40: CPT

## 2020-10-29 PROCEDURE — 99072 ADDL SUPL MATRL&STAF TM PHE: CPT

## 2020-10-30 PROBLEM — E11.9 TYPE 2 DIABETES MELLITUS WITHOUT COMPLICATION, UNSPECIFIED WHETHER LONG TERM INSULIN USE: Status: RESOLVED | Noted: 2020-10-30 | Resolved: 2020-10-30

## 2020-10-30 PROBLEM — Z85.46 HISTORY OF MALIGNANT NEOPLASM OF PROSTATE: Status: RESOLVED | Noted: 2020-10-30 | Resolved: 2020-10-30

## 2020-10-30 PROBLEM — Z82.49 FAMILY HISTORY OF CORONARY ARTERY DISEASE: Status: ACTIVE | Noted: 2020-10-30

## 2020-10-30 PROBLEM — R29.898 DROPPED HEAD SYNDROME: Status: ACTIVE | Noted: 2020-10-30

## 2020-10-30 PROBLEM — Z86.79 HISTORY OF ESSENTIAL HYPERTENSION: Status: RESOLVED | Noted: 2020-10-30 | Resolved: 2020-10-30

## 2020-10-30 PROBLEM — Z86.79 HISTORY OF CORONARY ARTERY DISEASE: Status: RESOLVED | Noted: 2020-10-30 | Resolved: 2020-10-30

## 2020-10-30 RX ORDER — METFORMIN HYDROCHLORIDE 500 MG/1
500 TABLET, COATED ORAL
Qty: 20 | Refills: 0 | Status: ACTIVE | COMMUNITY
Start: 2020-10-13

## 2020-10-30 RX ORDER — ATORVASTATIN CALCIUM 40 MG/1
40 TABLET, FILM COATED ORAL
Qty: 90 | Refills: 0 | Status: ACTIVE | COMMUNITY
Start: 2020-10-14

## 2020-10-30 RX ORDER — METOPROLOL SUCCINATE 25 MG/1
25 TABLET, EXTENDED RELEASE ORAL
Qty: 30 | Refills: 0 | Status: ACTIVE | COMMUNITY
Start: 2020-10-13

## 2020-10-30 RX ORDER — TELMISARTAN AND HYDROCHLOROTHIAZIDE 80; 25 MG/1; MG/1
80-25 TABLET ORAL
Qty: 30 | Refills: 0 | Status: ACTIVE | COMMUNITY
Start: 2020-10-13

## 2021-01-07 ENCOUNTER — APPOINTMENT (OUTPATIENT)
Dept: NEUROLOGY | Facility: CLINIC | Age: 66
End: 2021-01-07
Payer: MEDICARE

## 2021-01-07 DIAGNOSIS — G60.0 HEREDITARY MOTOR AND SENSORY NEUROPATHY: ICD-10-CM

## 2021-01-07 DIAGNOSIS — G56.03 CARPAL TUNNEL SYNDROM,BILATERAL UPPER LIMBS: ICD-10-CM

## 2021-01-07 PROCEDURE — 95886 MUSC TEST DONE W/N TEST COMP: CPT

## 2021-01-07 PROCEDURE — 99215 OFFICE O/P EST HI 40 MIN: CPT

## 2021-01-07 PROCEDURE — 99072 ADDL SUPL MATRL&STAF TM PHE: CPT

## 2021-01-07 PROCEDURE — 95912 NRV CNDJ TEST 11-12 STUDIES: CPT

## 2021-01-10 PROBLEM — G56.03 BILATERAL CARPAL TUNNEL SYNDROME: Status: ACTIVE | Noted: 2021-01-10

## 2021-01-10 PROBLEM — G60.0: Status: ACTIVE | Noted: 2021-01-10

## 2021-01-10 NOTE — HISTORY OF PRESENT ILLNESS
[FreeTextEntry1] : Mr. 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  There was no evidence of myopathy.\par \par I again interviewed and examined him and his wife.Megha stated that he continues to have low-grade neck pain occasionally refers to his right shoulder with occasional history of fall progressive loss of memory dysarthric speech and difficulty in writing mild swallowing slowness without bowel or bladder dysfunction and currently only walks with a walker and there is tremor in the hands.  There is also slow progressive dementia with confusion decreased sleep and nocturnal symptoms as well as daytime symptoms of visual hallucinations and gets confused at night and sleep walks and today I noticed that his mother and sister also had slow progressive dementia with history suggesting REM sleep behavioral disorder.  He denied any ptosis or diplopia.

## 2021-01-10 NOTE — PHYSICAL EXAM
[FreeTextEntry1] : General examination–vital signs were recorded and unremarkable.  There is no carotid bruit, thyromegaly or lymphadenopathy.  Chest is clear and heart sounds are normal.  Abdominal is soft and there is no tenderness.  Pedal pulsations are perceptible there is mild leg edema.  There are no meningeal signs.  Neck movements are minimally restricted but there is no weakness of the neck muscles.  Straight leg raising test is negative.\par \par Neurological evaluation–the patient has very poor attention span and mildly hypophonic speech which is slow and has history of drooling.  He has problems with recent memories and history of visual hallucinations.  Language abilities are intact.  Currently there is history of mild secondary depression with hallucinations but appears to have adequate insight and judgment.  He denied any arterial stenosis.\par \par Optic disks appear normal but I could not examine the details of his optic fundi.  Extraocular movements are slow with restriction of upward gaze and minimal restriction of downward gaze without diplopia ptosis and visual fields appear normal.  Myerson sign is weakly positive.  There is hypomimia and infrequent blinking with subdued facial expressions.  Tongue is midline and there is history of drooling.  Gag reflex is normal and there is no dysphagia.  Facial sensations are preserved and neck is slightly restricted and has a mild head drop but cannot sustain straight head for few minutes.  There is no diurnal variation of the head drop.  Cervical spine is without tenderness or muscle spasm.\par \par There is mild increased tone and history of tremor in the right hand at rest.  Movements are generally very slow but there is no focal motor strength loss though there is a diffuse 4/5 strength in both upper and lower extremities without clumsiness but has slowness.  Finger-to-nose and heel-to-shin testing is slow normal deep tendon reflexes are 2+ symmetric but ankle reflexes are absent.  There is no clonus.  There Is No Dermatomal or Distal Sensory Loss and There Is No Sensory Level.  He Walks with a Broad-Based Gait and Shuffles without Ability to Walk Tandem While His Limb Coordination Is Slow but Not Clumsy.  Romberg Sign Is Negative and There Is No Stooping Posture or Retropulsion.  He Is Able to Walk with Help but I Noted That He Walked Out Of My Office Slowly with His Wife with a Minimal Stooping Posture.

## 2021-01-10 NOTE — DATA REVIEWED
[de-identified] : I reviewed MRI of the brain which revealed bilateral moderate white matter disease described as microvascular disease without territorial infarction brainstem infarction mass lesion or hydrocephalus.  MRI of the cervical spine revealed multilevel degenerative changes with foraminal changes as well but there is no intrinsic spinal cord disease. [de-identified] : I also reviewed the EMG studies performed by Dr. Danielson who noted mild carpal tunnel syndrome and sensory neuropathy.  His current electrodiagnostic studies revealed moderate bilateral carpal tunnel syndrome and severe distal sensory neuropathy. [de-identified] : There are several consultation and follow-up evaluation by Dr. Danielson his neurologist who noted that serology was negative for myasthenia gravis and challenge him with Sinemet but the wife stated that there has not been any dramatic change.  He has not addressed the memory dysfunction, sleep behavioral disorder diffuse parkinsonism which appears to be more prominent with gait difficulty tremor dysarthria and supranuclear eye control problems.

## 2021-01-10 NOTE — REVIEW OF SYSTEMS
[Feeling Poorly] : feeling poorly [Depression] : depression [Confused or Disoriented] : confusion [Memory Lapses or Loss] : memory loss [Difficulty with Language] : no ~M difficulty with language [Decr. Concentrating Ability] : decreased concentrating ability [Changed Thought Patterns] : no change in thought patterns [Repeating Questions] : no repeated questioning about recent events [Hand Weakness] :  hand weakness [Leg Weakness] : leg weakness [Difficulty Writing] : difficulty writing [Poor Coordination] : poor coordination [Difficulties in Speech] : speech difficulties [Numbness] : numbness [Tingling] : tingling [Difficulty Walking] : difficulty walking [Inability to Walk] : able to walk [Ataxia] : ataxia [Frequent Falls] : frequent falls [As Noted in HPI] : as noted in HPI [Arthralgias] : arthralgias [Negative] : Heme/Lymph

## 2021-01-10 NOTE — DISCUSSION/SUMMARY
[FreeTextEntry1] : Opinion–\par \par The patient has multiple neurological issues and most importantly he has clear history of parkinsonism, dementia with visual hallucinations, head drop syndrome, possible neuropathy attributed to diabetes and some slowness of the supranuclear eye control and raises a suspicion of multisystem disease but needs to have a thorough investigations including MRI of the brain, cervical spine MRI and electrodiagnostic studies which reportedly has been achieved and his wife promised to secure the report from his neurologist and return back to the office for follow-up and electrophysiologic studies especially to rule out any myasthenia and diabetic neuropathy.  All precautions were discussed and will be further advised further work-up for dementia as he also has family history of Alzheimer's disease.  Extensive education and counseling was provided including fall precautions and treatment might commence after the investigations are complete.

## 2021-01-10 NOTE — HISTORY OF PRESENT ILLNESS
[FreeTextEntry1] : Mr. Pantoja is a 65-year-old -American male presented for neurological second opinion as he was already evaluated and investigated by Ramila Danielle ,though [place] Do not have any medical records and his wife stated that he had MRI of the brain, cervical spine and electrophysiologic testing and promised to secure all the records prior to his next evaluation.\par \par The patient has chronic progressive history of neck pain which is local in the neck with head drop since last 6 months with minimal neck pain and the pain is local bilateral without any evidence of radicular symptoms in the upper extremities and there is no tingling numbness but has diffuse upper extremity weakness and mild stiffness.  He also has decreased motor skills in both hands and slowness of the gait with weakness of the legs since last 6 months which is slowly progressive and denied any sensory symptoms such as tingling numbness or lancinating radicular pain and denied any bowel or bladder dysfunction has history of diffuse muscle aches.  Wife stated that he is slow to with mild shuffling gait and occasional fall but denied any head injury loss of consciousness and denied any chest pain shortness of breath ptosis diplopia dysarthria dysphagia or dyspnea but wife again stated that she has noted mild changes in his speech, mild drooling and hallucinations whereby he sometimes sees snakes in the house but is fully aware that these are false.  There is no auditory hallucinosis.  He also has mild slow cognitive dysfunction mostly in the recent memory and attention span but no history of language dysfunction.\par \par Past medical history is pertinent for prostate cancer treated with operation radiation therapy but no chemotherapy and there is no evidence of disease.  He has history of hypertension treated with medications and had history of angioplasty 3 years ago.  He is a diabetic on Metformin 500 mg daily for last 2 years and wife stated that diabetes is under reasonably good control.\par \par He has no toxic habits  and father had coronary artery disease and stroke and mother had Alzheimer's disease without any siblings history of neurological illnesses.  I reviewed his medications and allergies.

## 2021-01-10 NOTE — REVIEW OF SYSTEMS
[Feeling Poorly] : feeling poorly [Depression] : depression [Memory Lapses or Loss] : memory loss [Decr. Concentrating Ability] : decreased concentrating ability [Facial Weakness] : facial weakness [Arm Weakness] : arm weakness [Leg Weakness] : leg weakness [Poor Coordination] : poor coordination [Difficulties in Speech] : speech difficulties [Difficulty Walking] : difficulty walking [Ataxia] : ataxia [Frequent Falls] : frequent falls [As noted in HPI] : as noted in HPI [As Noted in HPI] : as noted in HPI [Negative] : Heme/Lymph [Confused or Disoriented] : no confusion [Difficulty with Language] : no ~M difficulty with language [Changed Thought Patterns] : no change in thought patterns [Difficulty Writing] : no difficulty writing [Repeating Questions] : no repeated questioning about recent events [Numbness] : no numbness [Tingling] : no tingling [Abnormal Sensation] : no abnormal sensation [Hypersensitivity] : no hypersensitivity [Seizures] : no convulsions [Dizziness] : no dizziness [Fainting] : no fainting [Lightheadedness] : no lightheadedness [Vertigo] : no vertigo [Cluster Headache] : no cluster headache [Migraine Headache] : no migraine headache [Tension Headache] : no tension-type headache [Inability to Walk] : able to walk [Limping] : not limping [de-identified] : Visual hallucinations

## 2021-01-10 NOTE — DISCUSSION/SUMMARY
[FreeTextEntry1] : Opinion–\par \par Mr. Pantoja has multiple neurological issues as follows\par \par There is slow progressive dementia with family history of similar dementia including REM sleep behavioral disorder and while it can be partly attributed to white matter disease of the brain due to microvascular disease I suspect there is associated dementia probably for frontotemporal or Lewy body disease.\par \par There is also problems with extrapyramidal system with parkinsonism some degree of supranuclear eye control abnormality including spasticity neuropathy and raises a suspicion of multisystem disorder.  While I attribute his neuropathy to diabetes it is somewhat rapidly progressive when I looked at the neurological examination by his previous neurologist and his electrophysiologic testing.\par \par I had a detailed conversation with the patient and his wife and they would like to follow-up with Dr. Danielson and I suggest repeat MRI of the brain, increasing the dose of Sinemet and investigations for peripheral neuropathy if necessary lumbar puncture and CSF analysis, EEG and preferably to see a Parkinson's disease expert including expert in dementia treatment as soon as possible and his wife is in agreement and will proceed with my advice.  I will forward my report to Dr. Danielson and personally speak with him.  The patient also has the option to return back to my office for appropriate specialty consultations and treatment.  I do not believe that he has myasthenia gravis further there is no evidence of myopathy.

## 2021-01-10 NOTE — DATA REVIEWED
[de-identified] : I do not have any medical records though his wife is stated that he has been evaluated and investigated by a neurologist and promised to secure all the records including MRI of the brain and cervical spine and electrodiagnostic studies.

## 2021-01-10 NOTE — PHYSICAL EXAM
[FreeTextEntry1] : General examination is unremarkable.  There is no carotid bruit, thyromegaly or lymphadenopathy.  Chest is clear and heart sounds are normal.  Pedal pulsations are perceptible and there are no meningeal signs.  There is mild leg edema and minimal bruit and infrequent blinking.\par \par The patient is alert oriented to the date including the presidents but there are several gaps in his memory and slowness with mild dysarthria appears to be slightly depressed but language functions appear.  He has slow resting tremor bilaterally and supranuclear eye controls are decreased in the vertical his with decreased facial expression low dysarthric speech but his gag reflex is normal and tongue is without atrophy and has classic apractic broad-based gait without Romberg sign there is mild bilateral grasp reflex and mild spasticity in both upper and lower extremities with symmetric 2++ reflexes in the upper extremities 3+ in the knee bilaterally and trace to 1+ ankle jerks and there is questionable Babinski sign.  Posterior column sensations are preserved.  Finger-to-nose and heel-to-shin testing is slow normal without clumsiness.

## 2021-01-21 ENCOUNTER — APPOINTMENT (OUTPATIENT)
Dept: NEUROLOGY | Facility: CLINIC | Age: 66
End: 2021-01-21
Payer: MEDICARE

## 2021-01-21 VITALS
WEIGHT: 242 LBS | BODY MASS INDEX: 37.98 KG/M2 | DIASTOLIC BLOOD PRESSURE: 81 MMHG | HEIGHT: 67 IN | SYSTOLIC BLOOD PRESSURE: 147 MMHG | HEART RATE: 88 BPM

## 2021-01-21 DIAGNOSIS — I67.9 CEREBROVASCULAR DISEASE, UNSPECIFIED: ICD-10-CM

## 2021-01-21 DIAGNOSIS — G20 PARKINSON'S DISEASE: ICD-10-CM

## 2021-01-21 DIAGNOSIS — R29.898 OTHER SYMPTOMS AND SIGNS INVOLVING THE MUSCULOSKELETAL SYSTEM: ICD-10-CM

## 2021-01-21 DIAGNOSIS — R25.1 TREMOR, UNSPECIFIED: ICD-10-CM

## 2021-01-21 DIAGNOSIS — Z82.0 FAMILY HISTORY OF EPILEPSY AND OTHER DISEASES OF THE NERVOUS SYSTEM: ICD-10-CM

## 2021-01-21 DIAGNOSIS — F41.9 ANXIETY DISORDER, UNSPECIFIED: ICD-10-CM

## 2021-01-21 DIAGNOSIS — Z87.891 PERSONAL HISTORY OF NICOTINE DEPENDENCE: ICD-10-CM

## 2021-01-21 DIAGNOSIS — R47.1 DYSARTHRIA AND ANARTHRIA: ICD-10-CM

## 2021-01-21 DIAGNOSIS — R49.9 UNSPECIFIED VOICE AND RESONANCE DISORDER: ICD-10-CM

## 2021-01-21 PROCEDURE — 99215 OFFICE O/P EST HI 40 MIN: CPT

## 2021-01-21 PROCEDURE — 96116 NUBHVL XM PHYS/QHP 1ST HR: CPT | Mod: 59

## 2021-01-21 PROCEDURE — 99072 ADDL SUPL MATRL&STAF TM PHE: CPT

## 2021-01-21 RX ORDER — MECLIZINE HYDROCHLORIDE 12.5 MG/1
12.5 TABLET ORAL
Qty: 60 | Refills: 0 | Status: COMPLETED | COMMUNITY
Start: 2020-10-19 | End: 2021-01-21

## 2021-01-21 RX ORDER — AZITHROMYCIN 250 MG/1
250 TABLET, FILM COATED ORAL
Qty: 6 | Refills: 0 | Status: COMPLETED | COMMUNITY
Start: 2020-10-12 | End: 2021-01-21

## 2021-01-21 RX ORDER — CEFUROXIME AXETIL 250 MG/1
250 TABLET ORAL
Qty: 20 | Refills: 0 | Status: COMPLETED | COMMUNITY
Start: 2020-10-19 | End: 2021-01-21

## 2021-01-21 RX ORDER — ASPIRIN 81 MG
81 TABLET, DELAYED RELEASE (ENTERIC COATED) ORAL
Refills: 0 | Status: ACTIVE | COMMUNITY

## 2021-02-05 ENCOUNTER — APPOINTMENT (OUTPATIENT)
Dept: MRI IMAGING | Facility: CLINIC | Age: 66
End: 2021-02-05

## 2021-02-06 ENCOUNTER — APPOINTMENT (OUTPATIENT)
Dept: NUCLEAR MEDICINE | Facility: IMAGING CENTER | Age: 66
End: 2021-02-06
Payer: MEDICARE

## 2021-02-06 ENCOUNTER — OUTPATIENT (OUTPATIENT)
Dept: OUTPATIENT SERVICES | Facility: HOSPITAL | Age: 66
LOS: 1 days | End: 2021-02-06
Payer: MEDICARE

## 2021-02-06 DIAGNOSIS — F03.90 UNSPECIFIED DEMENTIA, UNSPECIFIED SEVERITY, WITHOUT BEHAVIORAL DISTURBANCE, PSYCHOTIC DISTURBANCE, MOOD DISTURBANCE, AND ANXIETY: ICD-10-CM

## 2021-02-06 PROCEDURE — 78608 BRAIN IMAGING (PET): CPT | Mod: 26

## 2021-02-06 PROCEDURE — A9552: CPT

## 2021-02-06 PROCEDURE — 78608 BRAIN IMAGING (PET): CPT

## 2021-02-09 ENCOUNTER — NON-APPOINTMENT (OUTPATIENT)
Age: 66
End: 2021-02-09

## 2021-02-24 ENCOUNTER — APPOINTMENT (OUTPATIENT)
Dept: PSYCHIATRY | Facility: CLINIC | Age: 66
End: 2021-02-24
Payer: SELF-PAY

## 2021-02-24 PROCEDURE — 99072 ADDL SUPL MATRL&STAF TM PHE: CPT

## 2021-02-24 PROCEDURE — 99205 OFFICE O/P NEW HI 60 MIN: CPT

## 2021-03-03 ENCOUNTER — OUTPATIENT (OUTPATIENT)
Dept: OUTPATIENT SERVICES | Facility: HOSPITAL | Age: 66
LOS: 1 days | End: 2021-03-03

## 2021-03-03 ENCOUNTER — APPOINTMENT (OUTPATIENT)
Dept: NUCLEAR MEDICINE | Facility: IMAGING CENTER | Age: 66
End: 2021-03-03

## 2021-03-03 ENCOUNTER — APPOINTMENT (OUTPATIENT)
Dept: NEUROLOGY | Facility: CLINIC | Age: 66
End: 2021-03-03

## 2021-03-03 DIAGNOSIS — R25.1 TREMOR, UNSPECIFIED: ICD-10-CM

## 2021-03-03 DIAGNOSIS — F03.90 UNSPECIFIED DEMENTIA WITHOUT BEHAVIORAL DISTURBANCE: ICD-10-CM

## 2021-03-15 ENCOUNTER — NON-APPOINTMENT (OUTPATIENT)
Age: 66
End: 2021-03-15

## 2021-03-17 ENCOUNTER — APPOINTMENT (OUTPATIENT)
Dept: NEUROLOGY | Facility: CLINIC | Age: 66
End: 2021-03-17
Payer: MEDICARE

## 2021-03-17 ENCOUNTER — APPOINTMENT (OUTPATIENT)
Dept: SPEECH THERAPY | Facility: CLINIC | Age: 66
End: 2021-03-17

## 2021-03-17 ENCOUNTER — OUTPATIENT (OUTPATIENT)
Dept: OUTPATIENT SERVICES | Facility: HOSPITAL | Age: 66
LOS: 1 days | Discharge: HOME | End: 2021-03-17

## 2021-03-17 VITALS
WEIGHT: 233 LBS | HEART RATE: 93 BPM | BODY MASS INDEX: 36.57 KG/M2 | DIASTOLIC BLOOD PRESSURE: 82 MMHG | SYSTOLIC BLOOD PRESSURE: 150 MMHG | HEIGHT: 67 IN

## 2021-03-17 VITALS — TEMPERATURE: 97.5 F

## 2021-03-17 PROCEDURE — 99215 OFFICE O/P EST HI 40 MIN: CPT

## 2021-03-17 PROCEDURE — 99072 ADDL SUPL MATRL&STAF TM PHE: CPT

## 2021-03-17 RX ORDER — RIVASTIGMINE 4.6 MG/24H
4.6 PATCH, EXTENDED RELEASE TRANSDERMAL DAILY
Qty: 30 | Refills: 3 | Status: ACTIVE | COMMUNITY
Start: 2021-03-17 | End: 1900-01-01

## 2021-03-17 NOTE — PHYSICAL EXAM
[FreeTextEntry1] : There is facial masking. Saccades are slow vertical>horizontal. Speech is strained and tachyphemic at times. Tremor is absent. Anterocollis is present with nl neck extensor strength. Mary 2+ b/l with 2+ cogwheel rigidity on right more than left. 3+ neck rigidity palpated. Needs some assistance to stand and walks with reduced SL. Anterocollis does not change when walking. He is able to extend his head to the midline. No FOG. Does not recover on pull test

## 2021-03-17 NOTE — DISCUSSION/SUMMARY
[FreeTextEntry1] : Patient with parkinsonism and neurobehavioral dysfunction (hallucinations, paranoia). Presence of RBD further suggests underlying synuclien pathology and in context of clinical exam and PET raises suspicion an likelihood of lewy body disease. \par \par Patient was counseled on the following recommendations:\par \par - trial of exelon 4.6mg qd TD to assist with VH management\par - f/u with psychiatrist for neuroleptic titration and anxiety management\par - cont PT/OT/ST\par - Await swallow study results\par - will review brain MRI\par - will consider levodopa in the near future pending improvement in severity of his neurobehavioral condition\par \par f/u 3months

## 2021-03-17 NOTE — HISTORY OF PRESENT ILLNESS
[FreeTextEntry1] : Patient referred by Dr. Gibson\par \par He has had motoric decline since 0252-0057 manifesting as reduction in fine motor movements and gait/balance deterioration. He has concomitantly developed significant neurobehavioral symptoms - visual hallucinations (sees people, children in his home), mistakes his surroundings for other locations, and more recently has had paranoid delusions with respect to his wife. Over the past 5 months, FOG has emerged with frequency of falls increasing. He uses a walker to get around and needs help with most ADLs. In addition, his speech has become softer and more strained with choking on food also occurring more often. Denies feelings of depression but has anxiety, vince when his wife leaves the home. \par \par He saw a psychiatrist 3 weeks ago who started seroquel 12.5mg qhs which he does not want to take due to concerns of being over sedated. He has MIGUELINA and does not use cpap. Screams at times in his sleep. Feels SOB constantly for the past 2 years. No incontinence. Does PT/OT/ST weekly. \par \par W/u has included a brain MRI that reportedly showed mild MVD and a brain PET demonstrating parietal hypometabolism suggestive of DLB. \par \par

## 2021-03-19 ENCOUNTER — APPOINTMENT (OUTPATIENT)
Dept: PSYCHIATRY | Facility: CLINIC | Age: 66
End: 2021-03-19
Payer: MEDICARE

## 2021-03-19 DIAGNOSIS — R44.1 VISUAL HALLUCINATIONS: ICD-10-CM

## 2021-03-19 PROCEDURE — 99214 OFFICE O/P EST MOD 30 MIN: CPT

## 2021-03-19 PROCEDURE — 99072 ADDL SUPL MATRL&STAF TM PHE: CPT

## 2021-04-09 ENCOUNTER — APPOINTMENT (OUTPATIENT)
Dept: PSYCHIATRY | Facility: CLINIC | Age: 66
End: 2021-04-09
Payer: MEDICARE

## 2021-04-09 PROCEDURE — 99214 OFFICE O/P EST MOD 30 MIN: CPT

## 2021-04-17 ENCOUNTER — OUTPATIENT (OUTPATIENT)
Dept: OUTPATIENT SERVICES | Facility: HOSPITAL | Age: 66
LOS: 1 days | Discharge: HOME | End: 2021-04-17

## 2021-04-19 ENCOUNTER — NON-APPOINTMENT (OUTPATIENT)
Age: 66
End: 2021-04-19

## 2021-04-19 DIAGNOSIS — E53.8 DEFICIENCY OF OTHER SPECIFIED B GROUP VITAMINS: ICD-10-CM

## 2021-04-19 LAB
T PALLIDUM AB SER QL IA: NEGATIVE
THYROGLOB AB SERPL-ACNC: <20 IU/ML
THYROPEROXIDASE AB SERPL IA-ACNC: 11.3 IU/ML
TSH SERPL-ACNC: 1.15 UIU/ML
VIT B12 SERPL-MCNC: 299 PG/ML

## 2021-04-19 RX ORDER — ACETAMINOPHEN/DIPHENHYDRAMINE 500MG-25MG
1000 TABLET ORAL
Refills: 0 | Status: ACTIVE | COMMUNITY

## 2021-04-23 LAB — VIT B1 SERPL-MCNC: 125.8 NMOL/L

## 2021-04-26 ENCOUNTER — APPOINTMENT (OUTPATIENT)
Dept: NEUROLOGY | Facility: CLINIC | Age: 66
End: 2021-04-26
Payer: MEDICARE

## 2021-04-26 VITALS
HEART RATE: 96 BPM | DIASTOLIC BLOOD PRESSURE: 84 MMHG | WEIGHT: 230 LBS | HEIGHT: 67 IN | SYSTOLIC BLOOD PRESSURE: 174 MMHG | BODY MASS INDEX: 36.1 KG/M2

## 2021-04-26 DIAGNOSIS — G47.33 OBSTRUCTIVE SLEEP APNEA (ADULT) (PEDIATRIC): ICD-10-CM

## 2021-04-26 PROCEDURE — 99215 OFFICE O/P EST HI 40 MIN: CPT

## 2021-04-30 ENCOUNTER — APPOINTMENT (OUTPATIENT)
Dept: PSYCHIATRY | Facility: CLINIC | Age: 66
End: 2021-04-30
Payer: MEDICARE

## 2021-04-30 PROCEDURE — 99214 OFFICE O/P EST MOD 30 MIN: CPT

## 2021-04-30 NOTE — ASSESSMENT
[FreeTextEntry1] : CLINICAL SWALLOW EVALUATION\par \par Date of Report: 3/17/2021\par Date of Evaluation: 3/17/2021\par Patient Name: Tonny Pantoja\par Patient :1955\par Referring Physician: Dr. Abigail Gibson, neurologist\par Primary Diagnosis: Parkinson’s Disease and Dementia\par Treatment Diagnosis: Oropharyngeal Swallow\par Date of Onset: Approximately 2 months\par \par REASON FOR REFERRAL: \par Patient is a 65 year-old man with Parkinson’s Disease and Dementia who presented to the [facility] for a clinical dysphagia evaluation upon the referral of his Neurologist.\par \par HISTORY OF PRESENTING ILLNESS: \par Patient was accompanied by his wife for today's evaluation, who assisted in providing relevant health information. Patient was able to answer questions, follow directions, and engage in conversation with assistance from his wife. Patient reported when eating dry foods he feels the food is getting “stuck” during meals, but is able to clear with liquid wash. When asked where patient feels food is getting “stuck” he pointed to the middle area of chest. Patient denied coughing during meals. Upon further clinician questioning, patient reported shortness of breath and reflux which occurs at night. Patient does not take medication for reflux and denied any recent pneumonias. In addition, wife informed SLP patient has been receiving speech therapy at [facility] for the past 2 weeks addressing speech production. Patient receives services 3 times a week for 30 minutes. Wife also reported patient is receiving occupational therapy to address neck range of motion.  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)\par Insomnia, unspecified type (780.52) (G47.00)\par Muscle rigidity (780.99) (R29.898)\par MIGUELINA (obstructive sleep apnea) (327.23) (G47.33)\par Parkinsonism (332.0) (G20)\par 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)\par 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)\par Tremor (781.0) (R25.1)\par Visual hallucinations (368.16) (R44.1)\par \par Past Medical History\par History of coronary artery disease (V12.59) (Z86.79)\par History of essential hypertension (V12.59) (Z86.79)\par History of malignant neoplasm of prostate (V10.46) (Z85.46)\par History of Type 2 diabetes mellitus without complication, unspecified whether long term\par insulin use (250.00) (E11.9)\par \par Surgical History\par History of Angioplasty\par History of Prostate surgery\par \par CLINICAL FINDINGS:\par ORAL PERIPHERAL EXAM:\par Structures: WFL\par Symmetry: WFL\par Dentition: Complete\par Soft Palate: WFL\par Secretions: WFL\par Speech/Voice: Wife reported patient is moderately unintelligible as patient is frequently asked to repeat himself. SLP noted patient with head tilted downward throughout the duration of the evaluation and reduced vocal volume.  \par Hearing: WFL\par Functions: Assessment of the labio-lingual musculature revealed intact labial and lingual range of motion, speed and strength.\par \par Cognition / Communication: Patient presented awake, alert, able to answer questions, and follow directions with wife’s assistance. \par \par Consistencies Administered:\par Solids: puree and solids\par Liquids: honey thick liquids, nectar thick liquids, and thin liquids\par \par Oral Stage: Patient demonstrated adequate utensil stripping, adequate oral containment of bolus, adequate mastication and manipulation of bolus, bolus hold/delayed anterior to posterior transport of bolus, and adequate oral cavity clearance. \par \par Pharyngeal Stage: Patient demonstrated initiation of pharyngeal swallow and hyolaryngeal elevation and excursion noted upon palpation. Patient coughed and throat cleared immediately after consuming thin liquids via consecutive sips indicative of laryngeal penetration/aspiration. Single sips eliminated cough/throat clear response to thin liquids.  No clinically overt signs or symptoms of laryngeal penetration/aspiration across PO trials of puree, solids, honey thick liquids, nectar thick liquids, and thin liquids via single sips. \par \par \par RECOMMENDATIONS: \par 1) Soft solids and thin liquids via single sips.\par 2) Consider GI consult as per patient reports globus sensation, pointing to middle area of chest and reports reflux during the night.\par 3) Follow-up with referring physician \par 4) Follow up with treating SLP \par \par EDUCATION: \par Verbal educational information and instruction regarding the aforementioned recommendations were provided to the patient and his wife. Full understanding was demonstrated. \par \par Should you have additional questions/concerns, please contact the center at [phone].\par

## 2021-05-06 DIAGNOSIS — R13.12 DYSPHAGIA, OROPHARYNGEAL PHASE: ICD-10-CM

## 2021-05-10 DIAGNOSIS — R13.12 DYSPHAGIA, OROPHARYNGEAL PHASE: ICD-10-CM

## 2021-05-25 ENCOUNTER — APPOINTMENT (OUTPATIENT)
Dept: PSYCHIATRY | Facility: CLINIC | Age: 66
End: 2021-05-25
Payer: MEDICARE

## 2021-05-25 PROCEDURE — 99214 OFFICE O/P EST MOD 30 MIN: CPT

## 2021-06-22 ENCOUNTER — APPOINTMENT (OUTPATIENT)
Dept: PSYCHIATRY | Facility: CLINIC | Age: 66
End: 2021-06-22
Payer: MEDICARE

## 2021-06-22 DIAGNOSIS — R41.9 UNSPECIFIED SYMPTOMS AND SIGNS INVOLVING COGNITIVE FUNCTIONS AND AWARENESS: ICD-10-CM

## 2021-06-22 PROCEDURE — 99214 OFFICE O/P EST MOD 30 MIN: CPT

## 2021-06-24 PROBLEM — R41.9 COGNITIVE SAFETY ISSUE: Status: ACTIVE | Noted: 2021-04-26

## 2021-07-19 ENCOUNTER — APPOINTMENT (OUTPATIENT)
Dept: PSYCHIATRY | Facility: CLINIC | Age: 66
End: 2021-07-19

## 2021-07-28 ENCOUNTER — APPOINTMENT (OUTPATIENT)
Dept: NEUROLOGY | Facility: CLINIC | Age: 66
End: 2021-07-28

## 2021-08-06 ENCOUNTER — APPOINTMENT (OUTPATIENT)
Dept: NEUROLOGY | Facility: CLINIC | Age: 66
End: 2021-08-06

## 2021-09-15 ENCOUNTER — APPOINTMENT (OUTPATIENT)
Dept: PSYCHIATRY | Facility: CLINIC | Age: 66
End: 2021-09-15
Payer: MEDICARE

## 2021-09-15 DIAGNOSIS — G47.00 INSOMNIA, UNSPECIFIED: ICD-10-CM

## 2021-09-15 DIAGNOSIS — F29 UNSPECIFIED PSYCHOSIS NOT DUE TO A SUBSTANCE OR KNOWN PHYSIOLOGICAL CONDITION: ICD-10-CM

## 2021-09-15 DIAGNOSIS — F01.50 VASCULAR DEMENTIA W/OUT BEHAVIORAL DISTURBANCE: ICD-10-CM

## 2021-09-15 PROCEDURE — 99214 OFFICE O/P EST MOD 30 MIN: CPT

## 2021-09-15 RX ORDER — QUETIAPINE FUMARATE 25 MG/1
25 TABLET ORAL
Qty: 30 | Refills: 1 | Status: ACTIVE | COMMUNITY
Start: 2021-02-24

## 2021-12-02 ENCOUNTER — APPOINTMENT (OUTPATIENT)
Dept: NEUROLOGY | Facility: CLINIC | Age: 66
End: 2021-12-02

## 2022-04-28 NOTE — PHYSICAL THERAPY INITIAL EVALUATION ADULT - ASSISTIVE DEVICE FOR TRANSFER: SIT/STAND, REHAB EVAL
Patient notified of results/MD recommendations. Patient verbalizes understanding and has no further questions.     Med list updated.    no device
